# Patient Record
Sex: MALE | Race: OTHER | HISPANIC OR LATINO | URBAN - METROPOLITAN AREA
[De-identification: names, ages, dates, MRNs, and addresses within clinical notes are randomized per-mention and may not be internally consistent; named-entity substitution may affect disease eponyms.]

---

## 2022-07-09 ENCOUNTER — TELEPHONE ENCOUNTER (OUTPATIENT)
Dept: URBAN - METROPOLITAN AREA CLINIC 121 | Facility: CLINIC | Age: 57
End: 2022-07-09

## 2022-07-09 RX ORDER — SIMVASTATIN 40 MG/1
TABLET, FILM COATED ORAL
Refills: 0 | OUTPATIENT
Start: 2015-10-10 | End: 2016-05-11

## 2022-07-09 RX ORDER — DICLOFENAC SODIUM 1 %
GEL (GRAM) TOPICAL
Refills: 0 | OUTPATIENT
Start: 2015-10-10 | End: 2019-09-09

## 2022-07-09 RX ORDER — CHLORPHENIRAMINE MALEATE, IBUPROFEN, AND PHENYLEPHRINE HYDROCHLORIDE 4; 200; 10 MG/1; MG/1; MG/1
TABLET, COATED ORAL
Refills: 0 | OUTPATIENT
Start: 2016-05-11 | End: 2019-08-12

## 2022-07-10 ENCOUNTER — TELEPHONE ENCOUNTER (OUTPATIENT)
Dept: URBAN - METROPOLITAN AREA CLINIC 121 | Facility: CLINIC | Age: 57
End: 2022-07-10

## 2022-07-10 RX ORDER — DICLOFENAC SODIUM 1 %
GEL (GRAM) TOPICAL
Refills: 0 | Status: ACTIVE | COMMUNITY
Start: 2019-09-09

## 2022-07-10 RX ORDER — POLYETHYLENE GLYCOL 3350, SODIUM SULFATE, SODIUM CHLORIDE, POTASSIUM CHLORIDE, ASCORBIC ACID, SODIUM ASCORBATE 7.5-2.691G
TAKE AS DIRECTED KIT ORAL TAKE AS DIRECTED
Refills: 0 | Status: ACTIVE | COMMUNITY
Start: 2016-05-11

## 2023-09-18 ENCOUNTER — TELEPHONE ENCOUNTER (OUTPATIENT)
Dept: URBAN - METROPOLITAN AREA CLINIC 64 | Facility: CLINIC | Age: 58
End: 2023-09-18

## 2023-11-01 ENCOUNTER — OFFICE VISIT (OUTPATIENT)
Dept: URBAN - METROPOLITAN AREA CLINIC 63 | Facility: CLINIC | Age: 58
End: 2023-11-01

## 2023-11-29 ENCOUNTER — DASHBOARD ENCOUNTERS (OUTPATIENT)
Age: 58
End: 2023-11-29

## 2023-11-29 ENCOUNTER — OFFICE VISIT (OUTPATIENT)
Dept: URBAN - METROPOLITAN AREA CLINIC 63 | Facility: CLINIC | Age: 58
End: 2023-11-29
Payer: COMMERCIAL

## 2023-11-29 VITALS
HEART RATE: 59 BPM | TEMPERATURE: 97.4 F | WEIGHT: 191 LBS | DIASTOLIC BLOOD PRESSURE: 80 MMHG | BODY MASS INDEX: 28.29 KG/M2 | SYSTOLIC BLOOD PRESSURE: 130 MMHG | HEIGHT: 69 IN

## 2023-11-29 DIAGNOSIS — Z86.010 PERSONAL HISTORY OF COLONIC POLYPS: ICD-10-CM

## 2023-11-29 DIAGNOSIS — K62.5 RECTAL BLEEDING: ICD-10-CM

## 2023-11-29 DIAGNOSIS — K64.8 INTERNAL HEMORRHOIDS: ICD-10-CM

## 2023-11-29 DIAGNOSIS — Z12.11 ENCOUNTER FOR SCREENING FOR MALIGNANT NEOPLASM OF COLON: ICD-10-CM

## 2023-11-29 PROBLEM — 428283002: Status: ACTIVE | Noted: 2023-11-29

## 2023-11-29 PROBLEM — 4556007: Status: ACTIVE | Noted: 2023-11-29

## 2023-11-29 PROCEDURE — 99214 OFFICE O/P EST MOD 30 MIN: CPT

## 2023-11-29 RX ORDER — TERBINAFINE HYDROCHLORIDE 250 MG/1
1 TABLET TABLET ORAL ONCE A DAY
Status: ACTIVE | COMMUNITY

## 2023-11-29 RX ORDER — DICLOFENAC SODIUM 1 %
GEL (GRAM) TOPICAL
Refills: 0 | Status: ACTIVE | COMMUNITY
Start: 2019-09-09

## 2023-11-29 RX ORDER — ONDANSETRON HYDROCHLORIDE 4 MG/1
1 TABLET TABLET, FILM COATED ORAL
Qty: 2 | Refills: 0 | OUTPATIENT
Start: 2023-11-29

## 2023-11-29 NOTE — HPI-PREVIOUS PROCEDURES
Colonoscopy 5/26/2016; - Nonbleeding internal hemorrhoids - 2 diminutive polyps in the rectum and in the sigmoid colon. Resected and retrieved - Repeat colonoscopy in 5 to 10 years for surveillance based on pathology results - Pathology returning as follows; - Sigmoid colon polyp; hyperplastic polyp . EGD 8/15/2019; - Esophageal mucosal changes suspicious for short segment Burch's esophagus, biopsied - 1 cm hiatal hernia - Acute nonerosive gastritis, biopsy - 1 nonbleeding duodenal ulcer with no stigmata of bleeding - Pathology returning as follows; - Gastric antrum/body biopsy; reactive gastropathy with no significant acute and chronic inflammation. Negative for H. pylori organisms on stain - Lower third esophagus biopsy; esophageal squamous mucosa with rare eosinophilic infiltrate compatible with reflux esophagitis. Glandular mucosa with mild chronic inflammation. Stain negative for intestinal metaplasia or Candida, negative for dysplasia.

## 2023-11-29 NOTE — HPI-TODAY'S VISIT:
Yoseph is a 58-year-old male presenting to the office today for colonoscopy consult and a history of rectal bleeding. Rectal bleeding has been a problem for years, the last 3 years. Rectal bleeding has been off and on and will last for a week or so. He does have a history of internal hemorrhoids. He admits to mostly blood when wiping but sometimes has hematochezia as well. He has no other complaints, questions, concerns. His last colonoscopy was completed on 5/26/2016 and he does have a history of 2 polyps, he is due for colonoscopy. He denies any overuse of NSAIDs, pyrosis, dysphagia, dyspepsia, abdominal pain, constipation/diarrhea/change in bowel habits, melena, nausea/vomiting, kidney problems, anorexia, weight loss. He denies history of stroke, heart attack, pacemaker, defibrillator, stents, blood thinner use, COPD, asthma, sleep apnea. He does not see a cardiologist or pulmonologist. He is not diabetic and is not on a GLP-1.

## 2023-11-29 NOTE — HPI-PREVIOUS LABS
Labs dated 9/12/2023 displaying the following abnormalities; - Lipid panel; total cholesterol 201, HDL 25, triglycerides 545, cholesterol/HDL ratio 8.0, non- - CMP; glucose 143 - Hemoglobin A1c 6.2 - TSH 4.1 - CBC within normal limits - PSA within normal limits

## 2023-12-06 ENCOUNTER — LAB OUTSIDE AN ENCOUNTER (OUTPATIENT)
Dept: URBAN - METROPOLITAN AREA CLINIC 63 | Facility: CLINIC | Age: 58
End: 2023-12-06

## 2024-01-25 ENCOUNTER — OFFICE VISIT (OUTPATIENT)
Dept: URBAN - METROPOLITAN AREA SURGERY CENTER 4 | Facility: SURGERY CENTER | Age: 59
End: 2024-01-25

## 2024-05-09 ENCOUNTER — CLAIMS CREATED FROM THE CLAIM WINDOW (OUTPATIENT)
Dept: URBAN - METROPOLITAN AREA CLINIC 4 | Facility: CLINIC | Age: 59
End: 2024-05-09
Payer: COMMERCIAL

## 2024-05-09 ENCOUNTER — CLAIMS CREATED FROM THE CLAIM WINDOW (OUTPATIENT)
Dept: URBAN - METROPOLITAN AREA SURGERY CENTER 4 | Facility: SURGERY CENTER | Age: 59
End: 2024-05-09
Payer: COMMERCIAL

## 2024-05-09 DIAGNOSIS — K62.1 RECTAL POLYP: ICD-10-CM

## 2024-05-09 DIAGNOSIS — K63.5 POLYP OF DESCENDING COLON, UNSPECIFIED TYPE: ICD-10-CM

## 2024-05-09 DIAGNOSIS — Z86.010 PERSONAL HISTORY OF COLONIC POLYPS: ICD-10-CM

## 2024-05-09 DIAGNOSIS — Z12.11 COLON CANCER SCREENING (HIGH RISK): ICD-10-CM

## 2024-05-09 DIAGNOSIS — K64.0 FIRST DEGREE HEMORRHOIDS: ICD-10-CM

## 2024-05-09 DIAGNOSIS — K63.5 POLYP OF COLON: ICD-10-CM

## 2024-05-09 DIAGNOSIS — D12.3 BENIGN NEOPLASM OF TRANSVERSE COLON: ICD-10-CM

## 2024-05-09 DIAGNOSIS — D12.4 BENIGN NEOPLASM OF DESCENDING COLON: ICD-10-CM

## 2024-05-09 DIAGNOSIS — Z86.010 ADENOMAS PERSONAL HISTORY OF COLONIC POLYPS: ICD-10-CM

## 2024-05-09 PROCEDURE — 45385 COLONOSCOPY W/LESION REMOVAL: CPT | Performed by: INTERNAL MEDICINE

## 2024-05-09 PROCEDURE — 88305 TISSUE EXAM BY PATHOLOGIST: CPT | Performed by: PATHOLOGY

## 2024-05-09 PROCEDURE — 45380 COLONOSCOPY AND BIOPSY: CPT | Performed by: INTERNAL MEDICINE

## 2024-05-09 PROCEDURE — 00812 ANES LWR INTST SCR COLSC: CPT | Performed by: NURSE ANESTHETIST, CERTIFIED REGISTERED

## 2024-05-09 RX ORDER — TERBINAFINE HYDROCHLORIDE 250 MG/1
1 TABLET TABLET ORAL ONCE A DAY
Status: ACTIVE | COMMUNITY

## 2024-05-09 RX ORDER — ONDANSETRON HYDROCHLORIDE 4 MG/1
1 TABLET TABLET, FILM COATED ORAL
Qty: 2 | Refills: 0 | Status: ACTIVE | COMMUNITY
Start: 2023-11-29

## 2024-05-09 RX ORDER — DICLOFENAC SODIUM 1 %
GEL (GRAM) TOPICAL
Refills: 0 | Status: ACTIVE | COMMUNITY
Start: 2019-09-09
